# Patient Record
Sex: FEMALE | Race: WHITE | NOT HISPANIC OR LATINO | Employment: OTHER | URBAN - METROPOLITAN AREA
[De-identification: names, ages, dates, MRNs, and addresses within clinical notes are randomized per-mention and may not be internally consistent; named-entity substitution may affect disease eponyms.]

---

## 2019-07-18 PROBLEM — K92.2 UPPER GI BLEED: Status: ACTIVE | Noted: 2019-06-04

## 2019-07-18 PROBLEM — I85.01 BLEEDING ESOPHAGEAL VARICES: Status: ACTIVE | Noted: 2018-05-15

## 2019-07-18 PROBLEM — Z12.11 SCREEN FOR COLON CANCER: Status: ACTIVE | Noted: 2018-07-30

## 2019-07-18 PROBLEM — Z86.2 HISTORY OF ITP: Status: ACTIVE | Noted: 2018-08-31

## 2019-07-18 PROBLEM — K31.7 MULTIPLE GASTRIC POLYPS: Status: ACTIVE | Noted: 2019-06-06

## 2019-07-18 PROBLEM — K75.4 AUTOIMMUNE HEPATITIS: Status: ACTIVE | Noted: 2018-09-21

## 2019-07-18 PROBLEM — Z83.2 FAMILY HISTORY OF ITP: Status: ACTIVE | Noted: 2018-08-31

## 2019-07-18 PROBLEM — R10.9 ABDOMINAL PAIN: Status: ACTIVE | Noted: 2019-07-18

## 2019-07-18 PROBLEM — D75.838 THROMBOCYTOSIS AFTER SPLENECTOMY: Status: ACTIVE | Noted: 2018-08-31

## 2019-07-18 PROBLEM — K31.7 POLYP OF DUODENUM: Status: ACTIVE | Noted: 2019-07-18

## 2019-07-18 PROBLEM — D50.0 IRON DEFICIENCY ANEMIA DUE TO CHRONIC BLOOD LOSS: Status: ACTIVE | Noted: 2018-08-31

## 2019-07-18 PROBLEM — Z90.81 THROMBOCYTOSIS AFTER SPLENECTOMY: Status: ACTIVE | Noted: 2018-08-31

## 2019-07-18 PROBLEM — G43.909 MIGRAINE WITHOUT STATUS MIGRAINOSUS, NOT INTRACTABLE: Status: ACTIVE | Noted: 2018-07-27

## 2019-07-18 PROBLEM — I86.4 GASTRIC VARICES: Status: ACTIVE | Noted: 2018-11-05

## 2019-07-18 PROBLEM — I48.91 ATRIAL FIBRILLATION: Status: ACTIVE | Noted: 2018-07-27

## 2020-03-16 PROBLEM — M25.562 CHRONIC PAIN OF BOTH KNEES: Chronic | Status: ACTIVE | Noted: 2020-03-16

## 2020-03-16 PROBLEM — G89.29 CHRONIC PAIN OF BOTH KNEES: Chronic | Status: ACTIVE | Noted: 2020-03-16

## 2020-03-16 PROBLEM — G89.29 CHRONIC MIDLINE BACK PAIN: Chronic | Status: ACTIVE | Noted: 2020-03-16

## 2020-03-16 PROBLEM — M54.9 CHRONIC MIDLINE BACK PAIN: Chronic | Status: ACTIVE | Noted: 2020-03-16

## 2020-03-16 PROBLEM — M25.561 CHRONIC PAIN OF BOTH KNEES: Chronic | Status: ACTIVE | Noted: 2020-03-16

## 2021-05-12 ENCOUNTER — PATIENT MESSAGE (OUTPATIENT)
Dept: RESEARCH | Facility: HOSPITAL | Age: 51
End: 2021-05-12

## 2021-05-21 PROBLEM — R18.8 OTHER ASCITES: Status: ACTIVE | Noted: 2021-05-21

## 2021-05-21 PROBLEM — K76.6 PORTAL HYPERTENSION: Status: ACTIVE | Noted: 2021-05-21

## 2021-05-21 PROBLEM — L65.9 ALOPECIA: Status: ACTIVE | Noted: 2021-05-21

## 2021-05-21 PROBLEM — F32.A DEPRESSION: Status: ACTIVE | Noted: 2021-05-21

## 2021-10-17 PROBLEM — K21.9 GASTROESOPHAGEAL REFLUX DISEASE: Status: ACTIVE | Noted: 2021-10-17

## 2021-10-17 PROBLEM — E87.6 HYPOKALEMIA: Status: ACTIVE | Noted: 2021-10-17

## 2021-10-17 PROBLEM — M54.2 CERVICAL PAIN (NECK): Status: ACTIVE | Noted: 2021-10-17

## 2021-10-17 PROBLEM — M62.830 SPASM OF MUSCLE OF LOWER BACK: Status: ACTIVE | Noted: 2021-10-17

## 2021-10-18 PROBLEM — R43.0 ANOSMIA: Status: ACTIVE | Noted: 2021-10-18

## 2021-10-18 PROBLEM — R43.2 AGEUSIA: Status: ACTIVE | Noted: 2021-10-18

## 2022-02-15 PROBLEM — M62.81 MUSCLE WEAKNESS OF LOWER EXTREMITY: Status: ACTIVE | Noted: 2022-02-15

## 2022-02-15 PROBLEM — Z86.718 HISTORY OF BLOOD CLOTS: Status: ACTIVE | Noted: 2022-02-15

## 2022-02-15 PROBLEM — D50.9 IRON DEFICIENCY ANEMIA: Status: ACTIVE | Noted: 2022-02-15

## 2022-02-15 PROBLEM — M25.50 POLYARTHRALGIA: Status: ACTIVE | Noted: 2022-02-15

## 2022-02-15 PROBLEM — L40.9 PSORIASIS: Status: ACTIVE | Noted: 2022-02-15

## 2022-02-23 PROBLEM — M54.42 CHRONIC BILATERAL LOW BACK PAIN WITH BILATERAL SCIATICA: Status: ACTIVE | Noted: 2022-02-23

## 2022-02-23 PROBLEM — M54.41 CHRONIC BILATERAL LOW BACK PAIN WITH BILATERAL SCIATICA: Status: ACTIVE | Noted: 2022-02-23

## 2022-02-23 PROBLEM — G89.29 CHRONIC BILATERAL LOW BACK PAIN WITH BILATERAL SCIATICA: Status: ACTIVE | Noted: 2022-02-23

## 2022-02-24 PROBLEM — I81 PORTAL VEIN THROMBOSIS: Status: ACTIVE | Noted: 2022-02-24

## 2022-06-24 PROBLEM — E66.01 MORBID OBESITY: Status: ACTIVE | Noted: 2022-06-24

## 2022-06-24 PROBLEM — K74.69 OTHER CIRRHOSIS OF LIVER: Status: ACTIVE | Noted: 2022-06-24

## 2022-06-24 PROBLEM — U07.1 COVID-19: Status: ACTIVE | Noted: 2022-06-24

## 2022-06-24 PROBLEM — J90 PLEURAL EFFUSION: Status: ACTIVE | Noted: 2022-06-24

## 2022-06-25 DIAGNOSIS — U07.1 COVID-19 VIRUS DETECTED: ICD-10-CM

## 2022-08-18 PROBLEM — M62.81 MUSCLE WEAKNESS OF LOWER EXTREMITY: Status: RESOLVED | Noted: 2022-02-15 | Resolved: 2022-08-18

## 2023-03-03 PROBLEM — G89.29 CHRONIC NONINTRACTABLE HEADACHE: Status: ACTIVE | Noted: 2023-03-03

## 2023-03-03 PROBLEM — R51.9 CHRONIC NONINTRACTABLE HEADACHE: Status: ACTIVE | Noted: 2023-03-03

## 2023-03-03 PROBLEM — I85.10 SECONDARY ESOPHAGEAL VARICES WITHOUT BLEEDING: Status: ACTIVE | Noted: 2023-03-03

## 2023-06-01 PROBLEM — H93.A1 PULSATILE TINNITUS OF RIGHT EAR: Status: ACTIVE | Noted: 2023-06-01

## 2025-06-25 PROBLEM — E87.1 HYPONATREMIA: Status: ACTIVE | Noted: 2025-06-25

## 2025-06-25 PROBLEM — D64.9 ANEMIA: Status: ACTIVE | Noted: 2025-06-25

## 2025-06-26 PROBLEM — A41.9 SEPSIS WITHOUT ACUTE ORGAN DYSFUNCTION: Status: ACTIVE | Noted: 2025-06-26

## 2025-06-29 PROBLEM — H91.91 DECREASED HEARING OF RIGHT EAR: Status: ACTIVE | Noted: 2025-06-29

## 2025-06-29 PROBLEM — R11.2 NAUSEA WITH VOMITING: Status: ACTIVE | Noted: 2025-06-29

## 2025-08-08 ENCOUNTER — PATIENT OUTREACH (OUTPATIENT)
Dept: ADMINISTRATIVE | Facility: HOSPITAL | Age: 55
End: 2025-08-08
Payer: MEDICAID

## 2025-08-08 NOTE — PROGRESS NOTES
8-8-25- please address open care gap mammogram screening noted on 8-11-25 appt notes. . If screening completed at outside facility, please get sign DEYVI, to request records.